# Patient Record
(demographics unavailable — no encounter records)

---

## 2025-04-29 NOTE — REVIEW OF SYSTEMS
[Negative] : Psychiatric [Recent Change In Weight] : ~T no recent weight change [Vision Problems] : no vision problems [Constipation] : no constipation [FreeTextEntry7] : as per HPI [FreeTextEntry9] : left hip pain

## 2025-04-29 NOTE — HEALTH RISK ASSESSMENT
[Good] : ~his/her~  mood as  good [Yes] : Yes [Monthly or less (1 pt)] : Monthly or less (1 point) [1 or 2 (0 pts)] : 1 or 2 (0 points) [Never (0 pts)] : Never (0 points) [No falls in past year] : Patient reported no falls in the past year [1] : 1) Little interest or pleasure doing things for several days (1) [2] : 2) Feeling down, depressed, or hopeless for more than half of the days (2) [PHQ-2 Positive] : PHQ-2 Positive [Patient reported mammogram was normal] : Patient reported mammogram was normal [Patient reported PAP Smear was normal] : Patient reported PAP Smear was normal [Patient reported colonoscopy was normal] : Patient reported colonoscopy was normal [Alone] : lives alone [Employed] : employed [Feels Safe at Home] : Feels safe at home [Fully functional (bathing, dressing, toileting, transferring, walking, feeding)] : Fully functional (bathing, dressing, toileting, transferring, walking, feeding) [Fully functional (using the telephone, shopping, preparing meals, housekeeping, doing laundry, using] : Fully functional and needs no help or supervision to perform IADLs (using the telephone, shopping, preparing meals, housekeeping, doing laundry, using transportation, managing medications and managing finances) [Reports normal functional visual acuity (ie: able to read med bottle)] : Reports normal functional visual acuity [Never] : Never [NO] : No [Audit-CScore] : 0 [[id number]] : 3 [Reports changes in hearing] : Reports no changes in hearing [Reports changes in vision] : Reports no changes in vision [Reports changes in dental health] : Reports no changes in dental health [MammogramDate] : 07/24 [PapSmearDate] : 09/24 [ColonoscopyDate] : 10/24 [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

## 2025-04-29 NOTE — HISTORY OF PRESENT ILLNESS
[FreeTextEntry1] :  Establish care.  [de-identified] : Ms. JIMENA GE is a 54 year old female here today to establish care.  left hip pain Esophageal discomfort- not aggravated by swallowing  Eye: Decades ago.  TdaP: long time Shingrix: no PCV: No